# Patient Record
Sex: MALE | Race: WHITE
[De-identification: names, ages, dates, MRNs, and addresses within clinical notes are randomized per-mention and may not be internally consistent; named-entity substitution may affect disease eponyms.]

---

## 2021-08-04 PROBLEM — Z00.00 ENCOUNTER FOR PREVENTIVE HEALTH EXAMINATION: Status: ACTIVE | Noted: 2021-08-04

## 2021-08-05 ENCOUNTER — APPOINTMENT (OUTPATIENT)
Dept: PEDIATRIC ORTHOPEDIC SURGERY | Facility: CLINIC | Age: 66
End: 2021-08-05
Payer: COMMERCIAL

## 2021-08-05 VITALS — WEIGHT: 175 LBS | HEIGHT: 67 IN | BODY MASS INDEX: 27.47 KG/M2

## 2021-08-05 DIAGNOSIS — Z78.9 OTHER SPECIFIED HEALTH STATUS: ICD-10-CM

## 2021-08-05 DIAGNOSIS — Z86.39 PERSONAL HISTORY OF OTHER ENDOCRINE, NUTRITIONAL AND METABOLIC DISEASE: ICD-10-CM

## 2021-08-05 DIAGNOSIS — Z80.9 FAMILY HISTORY OF MALIGNANT NEOPLASM, UNSPECIFIED: ICD-10-CM

## 2021-08-05 DIAGNOSIS — Z82.49 FAMILY HISTORY OF ISCHEMIC HEART DISEASE AND OTHER DISEASES OF THE CIRCULATORY SYSTEM: ICD-10-CM

## 2021-08-05 DIAGNOSIS — M72.2 PLANTAR FASCIAL FIBROMATOSIS: ICD-10-CM

## 2021-08-05 PROCEDURE — 99213 OFFICE O/P EST LOW 20 MIN: CPT | Mod: 25

## 2021-08-05 PROCEDURE — 73630 X-RAY EXAM OF FOOT: CPT

## 2021-08-05 PROCEDURE — 20550 NJX 1 TENDON SHEATH/LIGAMENT: CPT

## 2021-08-05 RX ORDER — ATORVASTATIN CALCIUM 10 MG/1
10 TABLET, FILM COATED ORAL
Refills: 0 | Status: ACTIVE | COMMUNITY

## 2021-08-05 RX ORDER — TAMSULOSIN HYDROCHLORIDE 0.4 MG/1
CAPSULE ORAL
Refills: 0 | Status: ACTIVE | COMMUNITY

## 2021-08-05 RX ORDER — LEVOTHYROXINE SODIUM 125 UG/1
125 TABLET ORAL
Refills: 0 | Status: ACTIVE | COMMUNITY

## 2021-08-05 RX ADMIN — LIDOCAINE HYDROCHLORIDE 0 %: 10 INJECTION, SOLUTION INFILTRATION; PERINEURAL at 00:00

## 2021-08-05 RX ADMIN — Medication 0 MG/ML: at 00:00

## 2021-08-06 ENCOUNTER — MED ADMIN CHARGE (OUTPATIENT)
Age: 66
End: 2021-08-06

## 2021-08-06 PROBLEM — M72.2 PLANTAR FASCIAL FIBROMATOSIS OF RIGHT FOOT: Status: ACTIVE | Noted: 2021-08-06

## 2021-08-06 RX ORDER — METHYLPRED ACET/NACL,ISO-OS/PF 40 MG/ML
40 VIAL (ML) INJECTION
Qty: 1 | Refills: 0 | Status: COMPLETED | OUTPATIENT
Start: 2021-08-05

## 2021-08-06 RX ORDER — LIDOCAINE HYDROCHLORIDE 10 MG/ML
1 INJECTION, SOLUTION INFILTRATION; PERINEURAL
Qty: 0 | Refills: 0 | Status: COMPLETED | OUTPATIENT
Start: 2021-08-05

## 2021-08-06 NOTE — PROCEDURE
[FreeTextEntry1] : The right proximal plantar fascia was injected with 2 mL of 1% plain lidocaine and 1 mL of 40 mg of Depo-Medrol

## 2021-08-06 NOTE — HISTORY OF PRESENT ILLNESS
[FreeTextEntry1] : This 66-year-old male is here for evaluation of a 1 month history of pain in the proximal portion of the medial aspect of the right plantar fascia.  The patient does not recall a specific injury.  This is making ambulation difficult for this patient.

## 2021-08-06 NOTE — PHYSICAL EXAM
[de-identified] : On physical examination there is a full range of motion of the right ankle and right subtalar joints.  The patient has marked tenderness in the medial proximal portion of the plantar fascia.  He has pain when weightbearing. [de-identified] : X-ray of the right foot on 8/5/2021 (AP, lateral and oblique views) reveals no obvious abnormalities.

## 2021-08-06 NOTE — ASSESSMENT
[FreeTextEntry1] : Plantar fascial fibromatosis, right foot\par \par The patient will return if the cortisone injection has not helped.\par \par

## 2022-03-22 ENCOUNTER — APPOINTMENT (OUTPATIENT)
Dept: PEDIATRIC ORTHOPEDIC SURGERY | Facility: CLINIC | Age: 67
End: 2022-03-22
Payer: COMMERCIAL

## 2022-03-22 VITALS — HEIGHT: 67 IN | BODY MASS INDEX: 27.47 KG/M2 | WEIGHT: 175 LBS

## 2022-03-22 PROCEDURE — 99213 OFFICE O/P EST LOW 20 MIN: CPT | Mod: 25

## 2022-03-22 PROCEDURE — 20552 NJX 1/MLT TRIGGER POINT 1/2: CPT

## 2022-03-22 NOTE — ASSESSMENT
[FreeTextEntry1] : Right lumbar radiculitis with myalgia\par \par The patient has also been placed protocol of prednisone.\par \par Encounter time 21 minutes

## 2022-03-22 NOTE — PHYSICAL EXAM
[de-identified] : On physical examination right lateral bending and rotation increases this patient's leg pain.  Patient lacks full extension and right lateral bending increases his right leg pain.  Motor or sensory and deep tendon reflex examination of both lower extremities is within normal limits. [de-identified] : Review of MRI of the lumbosacral spine dated 3/10/2022 from [place] [facility] has been reviewed and reveals multiple levels degenerative changes with foraminal stenosis of a mild to moderate nature.

## 2022-03-22 NOTE — CONSULT LETTER
[Dear  ___] : Dear  [unfilled], [Consult Letter:] : I had the pleasure of evaluating your patient, [unfilled]. [Please see my note below.] : Please see my note below. [Consult Closing:] : Thank you very much for allowing me to participate in the care of this patient.  If you have any questions, please do not hesitate to contact me. [Sincerely,] : Sincerely, [FreeTextEntry3] : Dr Chavez\par

## 2022-03-22 NOTE — HISTORY OF PRESENT ILLNESS
[de-identified] : This 66-year-old male is here for evaluation of a recent history of increasing right leg pain.  Did see his primary care physician who ordered an MRI which has been reviewed revealed areas of foraminal stenosis at multiple levels.  He has not had treatment for this problem thus far.  The past he was treated for a lumbar radiculitis bonded to conservative treatment.

## 2022-03-22 NOTE — PROCEDURE
[de-identified] : The right lumbar spine musculature was injected with 2 mL of 1% lidocaine and 1 mL of 40 mg of Kenalog (trigger point injection).

## 2022-03-23 RX ORDER — PREDNISONE 10 MG/1
10 TABLET ORAL
Qty: 60 | Refills: 0 | Status: ACTIVE | COMMUNITY
Start: 2022-03-23 | End: 1900-01-01

## 2022-04-04 ENCOUNTER — APPOINTMENT (OUTPATIENT)
Dept: PEDIATRIC ORTHOPEDIC SURGERY | Facility: CLINIC | Age: 67
End: 2022-04-04
Payer: COMMERCIAL

## 2022-04-04 VITALS — BODY MASS INDEX: 27.47 KG/M2 | HEIGHT: 67 IN | WEIGHT: 175 LBS

## 2022-04-04 DIAGNOSIS — M54.16 RADICULOPATHY, LUMBAR REGION: ICD-10-CM

## 2022-04-04 DIAGNOSIS — M79.18 MYALGIA, OTHER SITE: ICD-10-CM

## 2022-04-04 PROCEDURE — 99212 OFFICE O/P EST SF 10 MIN: CPT

## 2022-04-04 NOTE — ASSESSMENT
[FreeTextEntry1] : Right lumbar radiculitis\par \par Patient will resume all his usual activities.\par \par Encounter time: 12 minutes

## 2022-04-04 NOTE — HISTORY OF PRESENT ILLNESS
[de-identified] : This patient returns for reevaluation of right lumbar radiculitis.  The patient is markedly improved after trigger point injection.  He no longer has leg pain or back pain.

## 2022-04-04 NOTE — PHYSICAL EXAM
[de-identified] : On physical examination there is no muscle spasm in the lumbar spine.  There is a full range of motion of the lumbar spine.  Straight leg raising test is negative bilaterally.  Motor or sensory and deep tendon reflex examination of both lower extremities is within normal limits.

## 2023-03-08 ENCOUNTER — APPOINTMENT (OUTPATIENT)
Dept: PEDIATRIC ORTHOPEDIC SURGERY | Facility: CLINIC | Age: 68
End: 2023-03-08
Payer: COMMERCIAL

## 2023-03-08 VITALS
TEMPERATURE: 96.7 F | WEIGHT: 173 LBS | DIASTOLIC BLOOD PRESSURE: 81 MMHG | BODY MASS INDEX: 27.15 KG/M2 | HEIGHT: 67 IN | SYSTOLIC BLOOD PRESSURE: 130 MMHG

## 2023-03-08 PROCEDURE — 99213 OFFICE O/P EST LOW 20 MIN: CPT

## 2023-03-08 PROCEDURE — 72040 X-RAY EXAM NECK SPINE 2-3 VW: CPT

## 2023-03-08 NOTE — ASSESSMENT
[FreeTextEntry1] : Degenerative disc disease C6-7\par \par This patient will be treated symptomatically.  He has been given a booklet for cervical spine exercises.  He will return on a as needed basis.

## 2023-03-08 NOTE — HISTORY OF PRESENT ILLNESS
[FreeTextEntry1] : This 67-year-old male is here for evaluation of a 2-week history of right cervical spine pain.  Patient does not recall a specific injury.  He has no radicular symptomatology.

## 2023-03-08 NOTE — DATA REVIEWED
[de-identified] : X-ray evaluation of the cervical spine on 3/8/2023 (AP and lateral views) reveals degenerative disc disease at C6-7.

## 2023-03-08 NOTE — PHYSICAL EXAM
[FreeTextEntry1] : On physical examination there is right cervical muscle tenderness and spasm.  The patient has increased pain on right lateral bending and rotation of the cervical spine.  There is a negative Spurling sign.  Motor or sensory and deep tendon reflex examination of both upper extremities is within normal limits.

## 2023-05-18 ENCOUNTER — APPOINTMENT (OUTPATIENT)
Dept: PEDIATRIC ORTHOPEDIC SURGERY | Facility: CLINIC | Age: 68
End: 2023-05-18
Payer: COMMERCIAL

## 2023-05-18 VITALS
BODY MASS INDEX: 26.68 KG/M2 | TEMPERATURE: 96.9 F | WEIGHT: 170 LBS | HEIGHT: 67 IN | SYSTOLIC BLOOD PRESSURE: 135 MMHG | DIASTOLIC BLOOD PRESSURE: 79 MMHG

## 2023-05-18 PROCEDURE — 20550 NJX 1 TENDON SHEATH/LIGAMENT: CPT

## 2023-05-18 PROCEDURE — 99212 OFFICE O/P EST SF 10 MIN: CPT | Mod: 25

## 2023-05-19 NOTE — PROCEDURE
[de-identified] : Because of the pain and limited function of his finger, 1 mL of 40 mg of Depo-Medrol and 1 mL of 1% plain Lidocaine was injected into the proximal flexor tendon sheath of the right fourth finger without adverse reaction.

## 2023-05-19 NOTE — PHYSICAL EXAM
[FreeTextEntry1] : On physical examination, this patient has tenderness in the region of the proximal flexor tendon sheath. He has obvious triggering. There is no obvious deformity. He can achieve a full range of motion of the right fourth finger.

## 2023-05-19 NOTE — ASSESSMENT
[FreeTextEntry1] : Right fourth trigger finger\par \par This patient has been made aware that if the injection does not help and he persists having pain and functional loss then he should consider trigger finger release. I discussed the nature of that surgery and potential risks and complications. He will be considering this.

## 2023-05-19 NOTE — HISTORY OF PRESENT ILLNESS
[FreeTextEntry1] : This 68-year-old male is here for evaluation of a recent history of right fourth trigger finger. This is causing disturbance with regards to his activities of daily living and it is painful.

## 2023-05-19 NOTE — PROCEDURE
[de-identified] : Because of the pain and limited function of his finger, 1 mL of 40 mg of Depo-Medrol and 1 mL of 1% plain Lidocaine was injected into the proximal flexor tendon sheath of the right fourth finger without adverse reaction.

## 2023-06-14 ENCOUNTER — APPOINTMENT (OUTPATIENT)
Dept: PEDIATRIC ORTHOPEDIC SURGERY | Facility: CLINIC | Age: 68
End: 2023-06-14
Payer: COMMERCIAL

## 2023-06-14 VITALS
SYSTOLIC BLOOD PRESSURE: 104 MMHG | HEIGHT: 67 IN | DIASTOLIC BLOOD PRESSURE: 76 MMHG | TEMPERATURE: 96.6 F | WEIGHT: 170 LBS | BODY MASS INDEX: 26.68 KG/M2

## 2023-06-14 PROCEDURE — 99213 OFFICE O/P EST LOW 20 MIN: CPT | Mod: 25

## 2023-06-14 PROCEDURE — 20552 NJX 1/MLT TRIGGER POINT 1/2: CPT

## 2023-06-16 NOTE — PHYSICAL EXAM
[de-identified] : On physical examination the patient has right cervical muscle spasm and tenderness.  Right lateral bending and rotation increases his cervical spine pain.  He lacks ability to rotate to the left and bend to the left.  Motor or sensory and deep tendon reflex examination of both upper extremities is within normal limits.

## 2023-06-16 NOTE — ASSESSMENT
[FreeTextEntry1] : DJD cervical spine\par Cervical muscle pain\par \par The patient has been advised that if he has not settled down within approximately 10 days then he should return for reinjection.

## 2023-06-16 NOTE — PROCEDURE
[de-identified] : The right cervical musculature has been injected with 2 mL of 1% plain lidocaine and 1 mL of 40 mg of Depo-Medrol.  (Trigger point injection)

## 2023-06-16 NOTE — HISTORY OF PRESENT ILLNESS
[de-identified] : This 68-year-old male returns for reevaluation of continued pain in the right cervical spine musculature.  He has not responded to conservative treatment thus far.  He does not complain of arm pain or paresthesias.

## 2023-07-12 ENCOUNTER — APPOINTMENT (OUTPATIENT)
Dept: PEDIATRIC ORTHOPEDIC SURGERY | Facility: CLINIC | Age: 68
End: 2023-07-12
Payer: COMMERCIAL

## 2023-07-12 VITALS
BODY MASS INDEX: 26.68 KG/M2 | DIASTOLIC BLOOD PRESSURE: 77 MMHG | WEIGHT: 170 LBS | SYSTOLIC BLOOD PRESSURE: 112 MMHG | TEMPERATURE: 96.9 F | HEIGHT: 67 IN

## 2023-07-12 DIAGNOSIS — M47.812 SPONDYLOSIS W/OUT MYELOPATHY OR RADICULOPATHY, CERVICAL REGION: ICD-10-CM

## 2023-07-12 DIAGNOSIS — M79.12 MYALGIA OF AUXILIARY MUSCLES, HEAD AND NECK: ICD-10-CM

## 2023-07-12 DIAGNOSIS — M19.071 PRIMARY OSTEOARTHRITIS, RIGHT ANKLE AND FOOT: ICD-10-CM

## 2023-07-12 PROCEDURE — 20552 NJX 1/MLT TRIGGER POINT 1/2: CPT

## 2023-07-12 PROCEDURE — 99213 OFFICE O/P EST LOW 20 MIN: CPT | Mod: 25

## 2023-07-12 PROCEDURE — 73630 X-RAY EXAM OF FOOT: CPT

## 2023-07-16 PROBLEM — M79.12 MYALGIA OF AUXILIARY MUSCLES, HEAD AND NECK: Status: ACTIVE | Noted: 2023-06-16

## 2023-07-16 PROBLEM — M19.071 OSTEOARTHRITIS OF JOINT OF TOE OF RIGHT FOOT: Status: ACTIVE | Noted: 2023-07-16

## 2023-07-16 PROBLEM — M47.812: Status: ACTIVE | Noted: 2023-03-08

## 2023-07-16 NOTE — PHYSICAL EXAM
[de-identified] : On physical examination the patient has tenderness in the right cervical musculature with muscle spasm in this area.  Full range of motion of the cervical spine cannot be achieved because of pain and muscle spasm.  Motor or sensory and deep tendon reflex examination of both upper extremities is within normal limits.  Examination of the right foot reveals dorsal osteophytes and decreased range of motion of the metatarsophalangeal joint of the great toe. [de-identified] : X-ray evaluation of the right foot on 7/12/2023 (AP, lateral and oblique views) reveals early degenerative arthritis of the metatarsophalangeal joint.  There is no obvious deformity.

## 2023-07-16 NOTE — ASSESSMENT
[FreeTextEntry1] : DJD cervical spine\par Myalgia of auxiliary muscles, head and neck\par DJD right first metatarsophalangeal joint\par \par

## 2023-07-16 NOTE — PROCEDURE
[de-identified] : 1 mL of 40 mg of Depo-Medrol and 2 mils of 1% plain lidocaine have been injected into the right cervical musculature approximately (trigger point injection) without adverse reaction.

## 2023-07-16 NOTE — HISTORY OF PRESENT ILLNESS
[de-identified] : This 68-year-old male returns for reevaluation of DJD of the cervical spine with recurrence of symptomatology.  He has pain in the cervical musculature proximally.  He has responded to previous cortisone injection and is hopeful for another injection.  Patient also has been having pain in the metatarsophalangeal joint of the right great toe.  There has been no history of injury.

## 2024-01-10 ENCOUNTER — APPOINTMENT (OUTPATIENT)
Dept: PEDIATRIC ORTHOPEDIC SURGERY | Facility: CLINIC | Age: 69
End: 2024-01-10

## 2024-02-01 ENCOUNTER — APPOINTMENT (OUTPATIENT)
Dept: PEDIATRIC ORTHOPEDIC SURGERY | Facility: CLINIC | Age: 69
End: 2024-02-01
Payer: COMMERCIAL

## 2024-02-01 VITALS
SYSTOLIC BLOOD PRESSURE: 111 MMHG | TEMPERATURE: 97 F | DIASTOLIC BLOOD PRESSURE: 70 MMHG | WEIGHT: 174 LBS | HEIGHT: 67 IN | BODY MASS INDEX: 27.31 KG/M2

## 2024-02-01 DIAGNOSIS — M65.341 TRIGGER FINGER, RIGHT RING FINGER: ICD-10-CM

## 2024-02-01 PROCEDURE — 20550 NJX 1 TENDON SHEATH/LIGAMENT: CPT

## 2024-02-01 PROCEDURE — 99213 OFFICE O/P EST LOW 20 MIN: CPT | Mod: 25

## 2024-02-01 NOTE — PROCEDURE
[FreeTextEntry1] : Under sterile technique with a Betadine prep the right fourth proximal flexor tendon sheath was injected with 40 mg of methylprednisolone and 1 cc 1% lidocaine with a Band-Aid dressing applied at the peak conclusion this was tolerated without incident

## 2024-02-01 NOTE — ASSESSMENT
[FreeTextEntry1] : Impression: Right ring trigger finger.  The finger has been injected without incident return on appearing basis

## 2024-02-01 NOTE — PHYSICAL EXAM
[de-identified] : Exam today reveals the pertinent findings limited to the right ring finger where he has obvious triggering present.

## 2024-02-01 NOTE — HISTORY OF PRESENT ILLNESS
[de-identified] : This 68-year-old returns she has had recurrence of triggering to his right ring finger.  His finger does lock up in the morning.

## 2025-07-17 ENCOUNTER — NON-APPOINTMENT (OUTPATIENT)
Age: 70
End: 2025-07-17

## 2025-07-17 ENCOUNTER — APPOINTMENT (OUTPATIENT)
Dept: PEDIATRIC ORTHOPEDIC SURGERY | Facility: CLINIC | Age: 70
End: 2025-07-17
Payer: COMMERCIAL

## 2025-07-17 VITALS — WEIGHT: 174 LBS | BODY MASS INDEX: 27.31 KG/M2 | TEMPERATURE: 96.6 F | HEIGHT: 67 IN

## 2025-07-17 PROCEDURE — 99213 OFFICE O/P EST LOW 20 MIN: CPT

## 2025-07-17 PROCEDURE — 73130 X-RAY EXAM OF HAND: CPT | Mod: LT

## 2025-07-17 RX ORDER — DICLOFENAC SODIUM 50 MG/1
50 TABLET, DELAYED RELEASE ORAL TWICE DAILY
Qty: 30 | Refills: 2 | Status: ACTIVE | COMMUNITY
Start: 2025-07-17 | End: 1900-01-01

## 2025-07-18 PROBLEM — M19.042 PRIMARY OSTEOARTHRITIS OF LEFT HAND: Status: ACTIVE | Noted: 2025-07-18

## 2025-08-26 ENCOUNTER — APPOINTMENT (OUTPATIENT)
Dept: PEDIATRIC ORTHOPEDIC SURGERY | Facility: CLINIC | Age: 70
End: 2025-08-26
Payer: COMMERCIAL

## 2025-08-26 VITALS
BODY MASS INDEX: 27.31 KG/M2 | SYSTOLIC BLOOD PRESSURE: 110 MMHG | HEIGHT: 67 IN | DIASTOLIC BLOOD PRESSURE: 70 MMHG | TEMPERATURE: 96.8 F | WEIGHT: 174 LBS

## 2025-08-26 DIAGNOSIS — M79.18 MYALGIA, OTHER SITE: ICD-10-CM

## 2025-08-26 PROCEDURE — 99212 OFFICE O/P EST SF 10 MIN: CPT

## 2025-09-18 ENCOUNTER — APPOINTMENT (OUTPATIENT)
Dept: PEDIATRIC ORTHOPEDIC SURGERY | Facility: CLINIC | Age: 70
End: 2025-09-18
Payer: COMMERCIAL

## 2025-09-18 VITALS
DIASTOLIC BLOOD PRESSURE: 70 MMHG | TEMPERATURE: 97.2 F | WEIGHT: 175 LBS | BODY MASS INDEX: 27.47 KG/M2 | SYSTOLIC BLOOD PRESSURE: 110 MMHG | HEIGHT: 67 IN

## 2025-09-18 DIAGNOSIS — M47.816 SPONDYLOSIS W/OUT MYELOPATHY OR RADICULOPATHY, LUMBAR REGION: ICD-10-CM

## 2025-09-18 DIAGNOSIS — M79.18 MYALGIA, OTHER SITE: ICD-10-CM

## 2025-09-18 PROCEDURE — 99214 OFFICE O/P EST MOD 30 MIN: CPT | Mod: 25

## 2025-09-18 PROCEDURE — 20552 NJX 1/MLT TRIGGER POINT 1/2: CPT | Mod: LT

## 2025-09-18 PROCEDURE — 72100 X-RAY EXAM L-S SPINE 2/3 VWS: CPT

## 2025-09-18 RX ORDER — METHYLPREDNISOLONE 4 MG/1
4 TABLET ORAL
Qty: 1 | Refills: 0 | Status: ACTIVE | COMMUNITY
Start: 2025-09-18 | End: 1900-01-01

## 2025-09-21 PROBLEM — M47.816 SPONDYLOSIS OF LUMBAR REGION WITHOUT MYELOPATHY OR RADICULOPATHY: Status: ACTIVE | Noted: 2025-09-21
